# Patient Record
Sex: MALE | Race: WHITE
[De-identification: names, ages, dates, MRNs, and addresses within clinical notes are randomized per-mention and may not be internally consistent; named-entity substitution may affect disease eponyms.]

---

## 2021-12-08 ENCOUNTER — HOSPITAL ENCOUNTER (OUTPATIENT)
Dept: HOSPITAL 33 - SDC-PAIN | Age: 86
Discharge: HOME | End: 2021-12-08
Attending: PSYCHIATRY & NEUROLOGY
Payer: MEDICARE

## 2021-12-08 DIAGNOSIS — Z79.899: ICD-10-CM

## 2021-12-08 DIAGNOSIS — F41.9: ICD-10-CM

## 2021-12-08 DIAGNOSIS — M17.11: Primary | ICD-10-CM

## 2021-12-08 DIAGNOSIS — F32.9: ICD-10-CM

## 2021-12-08 PROCEDURE — 20610 DRAIN/INJ JOINT/BURSA W/O US: CPT

## 2021-12-08 PROCEDURE — 73560 X-RAY EXAM OF KNEE 1 OR 2: CPT

## 2021-12-08 PROCEDURE — 77002 NEEDLE LOCALIZATION BY XRAY: CPT

## 2021-12-08 NOTE — XRAY
10 seconds fluoroscopy time in surgery for injections of the intra-articular

joint space and pes anserine of the left hip.

## 2021-12-08 NOTE — XRAY
Indication: Right knee injection.



Intraoperative fluoroscopy provided for 10 seconds.  Single digital spot image

submitted for interpretation demonstrates needle tip projecting over the right

femur intercondylar notch.  Small amount of contrast injected for needle tip

placement.  Correlate with intraoperative findings/report.

## 2021-12-16 ENCOUNTER — HOSPITAL ENCOUNTER (OUTPATIENT)
Dept: HOSPITAL 33 - SDC-PAIN | Age: 86
Discharge: HOME | End: 2021-12-16
Attending: PSYCHIATRY & NEUROLOGY
Payer: MEDICARE

## 2021-12-16 DIAGNOSIS — M17.11: Primary | ICD-10-CM

## 2021-12-16 DIAGNOSIS — Z79.899: ICD-10-CM

## 2021-12-16 PROCEDURE — 77002 NEEDLE LOCALIZATION BY XRAY: CPT

## 2021-12-16 PROCEDURE — 64454 NJX AA&/STRD GNCLR NRV BRNCH: CPT

## 2021-12-16 PROCEDURE — 73560 X-RAY EXAM OF KNEE 1 OR 2: CPT

## 2021-12-16 NOTE — XRAY
Indication: Right genicular nerve block.



Intraoperative fluoroscopy provided for 16 seconds.  2 digital spot images of

the right knee submitted for interpretation demonstrates anterior

medial/lateral supraclavicular and medial tibial plateau needles.  Correlate

with intraoperative findings/report.

## 2022-01-19 ENCOUNTER — HOSPITAL ENCOUNTER (OUTPATIENT)
Dept: HOSPITAL 33 - SDC-PAIN | Age: 87
Discharge: HOME | End: 2022-01-19
Attending: PSYCHIATRY & NEUROLOGY
Payer: MEDICARE

## 2022-01-19 DIAGNOSIS — Z79.899: ICD-10-CM

## 2022-01-19 DIAGNOSIS — M17.11: Primary | ICD-10-CM

## 2022-01-19 PROCEDURE — 77002 NEEDLE LOCALIZATION BY XRAY: CPT

## 2022-01-19 PROCEDURE — 64624 DSTRJ NULYT AGT GNCLR NRV: CPT

## 2022-01-19 PROCEDURE — 73560 X-RAY EXAM OF KNEE 1 OR 2: CPT

## 2022-01-19 NOTE — XRAY
Indication: Right knee genicular RFA



Intraoperative fluoroscopy provided for 39 seconds.  3 digital spot images of

the right knee submitted for interpretation demonstrates anterior needle tips

projecting medial/lateral supracondylar and medial tibial plateau.  Correlate

with intraoperative findings/report.